# Patient Record
Sex: FEMALE | Race: WHITE | NOT HISPANIC OR LATINO
[De-identification: names, ages, dates, MRNs, and addresses within clinical notes are randomized per-mention and may not be internally consistent; named-entity substitution may affect disease eponyms.]

---

## 2023-09-18 ENCOUNTER — NON-APPOINTMENT (OUTPATIENT)
Age: 53
End: 2023-09-18

## 2023-09-19 ENCOUNTER — APPOINTMENT (OUTPATIENT)
Dept: ORTHOPEDIC SURGERY | Facility: CLINIC | Age: 53
End: 2023-09-19
Payer: COMMERCIAL

## 2023-09-19 VITALS — HEIGHT: 67 IN | WEIGHT: 218 LBS | BODY MASS INDEX: 34.21 KG/M2

## 2023-09-19 DIAGNOSIS — Z86.39 PERSONAL HISTORY OF OTHER ENDOCRINE, NUTRITIONAL AND METABOLIC DISEASE: ICD-10-CM

## 2023-09-19 DIAGNOSIS — Z78.9 OTHER SPECIFIED HEALTH STATUS: ICD-10-CM

## 2023-09-19 DIAGNOSIS — M81.0 AGE-RELATED OSTEOPOROSIS W/OUT CURRENT PATHOLOGICAL FRACTURE: ICD-10-CM

## 2023-09-19 DIAGNOSIS — J45.909 UNSPECIFIED ASTHMA, UNCOMPLICATED: ICD-10-CM

## 2023-09-19 DIAGNOSIS — N81.10 CYSTOCELE, UNSPECIFIED: ICD-10-CM

## 2023-09-19 DIAGNOSIS — Z87.39 PERSONAL HISTORY OF OTHER DISEASES OF THE MUSCULOSKELETAL SYSTEM AND CONNECTIVE TISSUE: ICD-10-CM

## 2023-09-19 PROBLEM — Z00.00 ENCOUNTER FOR PREVENTIVE HEALTH EXAMINATION: Status: ACTIVE | Noted: 2023-09-19

## 2023-09-19 PROCEDURE — 99213 OFFICE O/P EST LOW 20 MIN: CPT

## 2023-09-19 PROCEDURE — 73562 X-RAY EXAM OF KNEE 3: CPT | Mod: LT

## 2023-09-19 RX ORDER — ASPIRIN 325 MG/1
TABLET, FILM COATED ORAL
Refills: 0 | Status: ACTIVE | COMMUNITY

## 2023-09-19 RX ORDER — LEVOTHYROXINE SODIUM 137 UG/1
TABLET ORAL
Refills: 0 | Status: ACTIVE | COMMUNITY

## 2023-09-19 RX ORDER — CETIRIZINE HYDROCHLORIDE 10 MG/1
TABLET, FILM COATED ORAL
Refills: 0 | Status: ACTIVE | COMMUNITY

## 2023-09-19 RX ORDER — SUMATRIPTAN SUCCINATE 100 MG/1
TABLET, FILM COATED ORAL
Refills: 0 | Status: ACTIVE | COMMUNITY

## 2023-09-27 ENCOUNTER — APPOINTMENT (OUTPATIENT)
Dept: ORTHOPEDIC SURGERY | Facility: CLINIC | Age: 53
End: 2023-09-27
Payer: COMMERCIAL

## 2023-09-27 VITALS — BODY MASS INDEX: 34.53 KG/M2 | WEIGHT: 220 LBS | HEIGHT: 67 IN

## 2023-09-27 PROCEDURE — 99213 OFFICE O/P EST LOW 20 MIN: CPT

## 2023-09-27 PROCEDURE — 73562 X-RAY EXAM OF KNEE 3: CPT | Mod: LT

## 2023-10-11 ENCOUNTER — APPOINTMENT (OUTPATIENT)
Dept: ORTHOPEDIC SURGERY | Facility: CLINIC | Age: 53
End: 2023-10-11
Payer: COMMERCIAL

## 2023-10-11 VITALS — WEIGHT: 220 LBS | HEIGHT: 67 IN | BODY MASS INDEX: 34.53 KG/M2

## 2023-10-11 PROCEDURE — 99213 OFFICE O/P EST LOW 20 MIN: CPT

## 2023-10-11 PROCEDURE — 73562 X-RAY EXAM OF KNEE 3: CPT | Mod: LT

## 2023-10-18 ENCOUNTER — NON-APPOINTMENT (OUTPATIENT)
Age: 53
End: 2023-10-18

## 2023-10-25 ENCOUNTER — APPOINTMENT (OUTPATIENT)
Dept: ORTHOPEDIC SURGERY | Facility: CLINIC | Age: 53
End: 2023-10-25
Payer: COMMERCIAL

## 2023-10-25 VITALS — WEIGHT: 220 LBS | HEIGHT: 67 IN | BODY MASS INDEX: 34.53 KG/M2

## 2023-10-25 PROCEDURE — 73562 X-RAY EXAM OF KNEE 3: CPT | Mod: LT

## 2023-10-25 PROCEDURE — 99213 OFFICE O/P EST LOW 20 MIN: CPT

## 2023-11-08 ENCOUNTER — APPOINTMENT (OUTPATIENT)
Dept: ORTHOPEDIC SURGERY | Facility: CLINIC | Age: 53
End: 2023-11-08
Payer: COMMERCIAL

## 2023-11-08 VITALS — WEIGHT: 213 LBS | BODY MASS INDEX: 33.43 KG/M2 | HEIGHT: 67 IN

## 2023-11-08 PROCEDURE — 99213 OFFICE O/P EST LOW 20 MIN: CPT

## 2023-11-08 PROCEDURE — 73560 X-RAY EXAM OF KNEE 1 OR 2: CPT | Mod: LT

## 2023-12-13 ENCOUNTER — APPOINTMENT (OUTPATIENT)
Dept: ORTHOPEDIC SURGERY | Facility: CLINIC | Age: 53
End: 2023-12-13
Payer: COMMERCIAL

## 2023-12-13 VITALS — BODY MASS INDEX: 33.43 KG/M2 | WEIGHT: 213 LBS | HEIGHT: 67 IN

## 2023-12-13 PROCEDURE — 73562 X-RAY EXAM OF KNEE 3: CPT | Mod: LT

## 2023-12-13 PROCEDURE — 99213 OFFICE O/P EST LOW 20 MIN: CPT

## 2023-12-13 NOTE — ASSESSMENT
[FreeTextEntry1] : Patient is doing much better but needs to work on strengthening her quads to improve her stairclimbing ability and distance walking she will continue work with the therapist and return for follow-up examination in 4 weeks.

## 2023-12-13 NOTE — HISTORY OF PRESENT ILLNESS
[Dull/Aching] : dull/aching [Throbbing] : throbbing [Meds] : meds [Ice] : ice [Heat] : heat [Standing] : standing [3] : 3 [1] : 2 [Intermittent] : intermittent [Physical therapy] : physical therapy [Walking] : walking [Retired] : Work status: retired [] : Post Surgical Visit: no [FreeTextEntry1] : left knee [FreeTextEntry5] : Going to PT. Swelling is uncomfortable [FreeTextEntry6] : pulling [de-identified] : mri and xray

## 2023-12-13 NOTE — DATA REVIEWED
[FreeTextEntry1] : 3 x-rays of the left knee are done and reviewed today showing satisfied position and healing of the tibial plateau fracture site

## 2023-12-13 NOTE — REASON FOR VISIT
[FreeTextEntry2] : FxFu- left knee Patient returns to the office in follow-up of her left knee tibial plateau fracture at this point time she has minimal pain.  She has missed the last week or so of therapy to her recent illness but she is feeling better at this point time examination reveals ambulates with a normal gait left knee has minimal swelling with range of motion 0 to 120 degrees he has no significant tenderness at the previous tibial plateau fracture site her quad strength is somewhat decreased at 4 out of 5 and she has no gross instability in the left knee.

## 2024-01-17 ENCOUNTER — APPOINTMENT (OUTPATIENT)
Dept: ORTHOPEDIC SURGERY | Facility: CLINIC | Age: 54
End: 2024-01-17
Payer: COMMERCIAL

## 2024-01-17 VITALS — HEIGHT: 67 IN | BODY MASS INDEX: 33.43 KG/M2 | WEIGHT: 213 LBS

## 2024-01-17 PROCEDURE — 99213 OFFICE O/P EST LOW 20 MIN: CPT

## 2024-01-17 NOTE — ASSESSMENT
[FreeTextEntry1] : Patient continues to gradually improve regarding her knee injury.  Presently she is ambulating pretty well however her she does still have some mild discomfort with stairs and getting out of a chair situation I stressed the importance of working on her quadricep strength and hopefully this will continue to improve her overall function she will do that and she will return in approximately 4 weeks.

## 2024-01-17 NOTE — REASON FOR VISIT
[FreeTextEntry2] : Pain left knee Patient returns to the office in follow-up of her left knee she presently reports minimal pain in the knee but does still have some difficulty especially going up and down stairs and getting out of a chair [place] of the left knee today reveals ambulates with a fairly normal gait left knee range of motion is from 0-120 she has minimal crepitus on range of motion she has no gross instability Quad strength is 4 out of 5 on the left side

## 2024-01-17 NOTE — HISTORY OF PRESENT ILLNESS
[3] : 3 [Dull/Aching] : dull/aching [Rest] : rest [Ice] : ice [Heat] : heat [Standing] : standing [Walking] : walking [Stairs] : stairs [] : no [FreeTextEntry1] : left knee [de-identified] : getting up [de-identified] : 12/13/23 [de-identified] : dr Huddleston [de-identified] : 1/24 [de-identified] : PT

## 2024-02-21 ENCOUNTER — APPOINTMENT (OUTPATIENT)
Dept: ORTHOPEDIC SURGERY | Facility: CLINIC | Age: 54
End: 2024-02-21
Payer: COMMERCIAL

## 2024-02-21 PROCEDURE — 99213 OFFICE O/P EST LOW 20 MIN: CPT

## 2024-02-21 NOTE — HISTORY OF PRESENT ILLNESS
[Dull/Aching] : dull/aching [Rest] : rest [Ice] : ice [Heat] : heat [Standing] : standing [Walking] : walking [Stairs] : stairs [2] : 2 [0] : 0 [] : no [FreeTextEntry1] : left knee [FreeTextEntry6] : pulling [de-identified] : getting up [de-identified] : 12/13/23 [de-identified] : dr Huddleston [de-identified] : 1/24 [de-identified] : PT

## 2024-02-21 NOTE — ASSESSMENT
[FreeTextEntry1] : Patient continues to gradually improve improve her overall function she continues to work on her strength she continues to work with the therapist to increase her quad strength and normalize her gait and make stairs easier she will do that and return 1 more time in 3 to 4 weeks for final evaluation.

## 2024-02-21 NOTE — REASON FOR VISIT
[FreeTextEntry2] : FU - left knee Patient returns to the office with no significant plaints of pain radiating into her left knee she is walking well with no significant pain she still reports some discomfort going up and down stairs especially going down and she is attending therapy to try and keep building up her quad strength range of motion knee 0-1 20 minimal swelling noted and quad strength 4 out of 5 no gross instability in the left knee

## 2024-03-13 ENCOUNTER — APPOINTMENT (OUTPATIENT)
Dept: ORTHOPEDIC SURGERY | Facility: CLINIC | Age: 54
End: 2024-03-13
Payer: COMMERCIAL

## 2024-03-13 VITALS — HEIGHT: 67 IN | WEIGHT: 213 LBS | BODY MASS INDEX: 33.43 KG/M2

## 2024-03-13 PROCEDURE — 99213 OFFICE O/P EST LOW 20 MIN: CPT

## 2024-03-13 RX ORDER — VITAMIN K2 90 MCG
1000 CAPSULE ORAL
Refills: 0 | Status: ACTIVE | COMMUNITY

## 2024-03-13 NOTE — REASON FOR VISIT
[FreeTextEntry2] : Continued pain and swelling left knee Patient returns to the office describing minimal symptoms regarding her left knee and she continues to gradually increase her strength with physical therapy he is ambulating with a fairly normal gait left knee has minimal swelling range of motion 0-1 25 she has no gross instability no significant tenderness at the fracture site she does appear to have some mild patella subluxation laterally in addition to some patellofemoral symptoms quad strength is 4 out of 5 slightly decreased compared to the contralateral side

## 2024-03-13 NOTE — HISTORY OF PRESENT ILLNESS
[0] : 0 [Tightness] : tightness [Squeezing] : squeezing [Retired] : Work status: retired [] : no [FreeTextEntry1] : left knee [de-identified] : DR burton [de-identified] : pt

## 2024-03-13 NOTE — ASSESSMENT
[FreeTextEntry1] : Patient will continue work with the physical therapist on increasing her quad strength especially on the left side since I believe some of her current symptoms are patellofemoral in etiology she may consider getting on kinesiotaping over the left knee and we will recheck her in 3 to 4 weeks and maybe do a new x-ray to evaluate the patellofemoral alignment at this point time she may is mainly needs to continue work on her quad strength.

## 2024-04-10 ENCOUNTER — APPOINTMENT (OUTPATIENT)
Dept: ORTHOPEDIC SURGERY | Facility: CLINIC | Age: 54
End: 2024-04-10
Payer: COMMERCIAL

## 2024-04-10 VITALS — WEIGHT: 220 LBS | HEIGHT: 67 IN | BODY MASS INDEX: 34.53 KG/M2

## 2024-04-10 PROCEDURE — 73562 X-RAY EXAM OF KNEE 3: CPT | Mod: LT

## 2024-04-10 PROCEDURE — 99213 OFFICE O/P EST LOW 20 MIN: CPT

## 2024-04-10 NOTE — REASON FOR VISIT
[FreeTextEntry2] :  Patient returns to the office in follow-up regarding her left knee with decreased pain since the previous visit examination today reveals ambulates with a fairly normal gait left knee has minimal swelling with range of motion 0-1 20 she has no gross instability she has no significant tenderness at the previous fracture site no negative meniscal signs and quad strength is 4 out of 5 on the left side

## 2024-04-10 NOTE — DATA REVIEWED
[FreeTextEntry1] : 3 x-rays of the left knee are performed today and are consistent with full healing of the fracture site with no significant displacement

## 2024-04-10 NOTE — HISTORY OF PRESENT ILLNESS
[0] : 0 [Tightness] : tightness [Squeezing] : squeezing [Retired] : Work status: retired [] : no [FreeTextEntry1] : left knee [de-identified] : DR burton [FreeTextEntry5] : still going to PT [de-identified] : pt

## 2024-04-10 NOTE — ASSESSMENT
[FreeTextEntry1] : Patient will continue work with the physical therapist on increasing her quad strength  Hopefully this will help with getting out of chairs and going up and down stairs I believe that is her weakness at this point time she will be rechecked again in 4 weeks

## 2024-05-14 ENCOUNTER — APPOINTMENT (OUTPATIENT)
Dept: ORTHOPEDIC SURGERY | Facility: CLINIC | Age: 54
End: 2024-05-14
Payer: COMMERCIAL

## 2024-05-14 VITALS — WEIGHT: 220 LBS | HEIGHT: 67 IN | BODY MASS INDEX: 34.53 KG/M2

## 2024-05-14 PROCEDURE — 99213 OFFICE O/P EST LOW 20 MIN: CPT

## 2024-05-14 NOTE — HISTORY OF PRESENT ILLNESS
[2] : 2 [0] : 0 [Dull/Aching] : dull/aching [Retired] : Work status: retired [FreeTextEntry1] : Left knee [] : no [FreeTextEntry5] : Patient reports that she has been attending physical therapy 2x a week and notes improvement, stating that her strength has been improving.

## 2024-05-14 NOTE — ASSESSMENT
[FreeTextEntry1] : Patient is scheduled to have some unrelated surgery in the next week to 10 days and will have to limit her activities she will recheck here in 3 to 4 weeks to see if she has any further therapy.

## 2024-05-14 NOTE — REASON FOR VISIT
[FreeTextEntry2] : Follow up: Left knee Patient returns the office in follow-up regarding the left tibial plateau fracture at this point in time her pain is minimal and she is doing well she saw some mild difficulty going downstairs mostly range of motion 0-1 25 with minimal minimal pain and no significant swelling quad strength is 4+ out of 5 on the left side

## 2024-06-12 ENCOUNTER — APPOINTMENT (OUTPATIENT)
Dept: ORTHOPEDIC SURGERY | Facility: CLINIC | Age: 54
End: 2024-06-12
Payer: COMMERCIAL

## 2024-06-12 ENCOUNTER — APPOINTMENT (OUTPATIENT)
Dept: ORTHOPEDIC SURGERY | Facility: CLINIC | Age: 54
End: 2024-06-12

## 2024-06-12 VITALS — BODY MASS INDEX: 34.53 KG/M2 | WEIGHT: 220 LBS | HEIGHT: 67 IN

## 2024-06-12 DIAGNOSIS — Z86.39 PERSONAL HISTORY OF OTHER ENDOCRINE, NUTRITIONAL AND METABOLIC DISEASE: ICD-10-CM

## 2024-06-12 DIAGNOSIS — S82.142A DISPLACED BICONDYLAR FRACTURE OF LEFT TIBIA, INITIAL ENCOUNTER FOR CLOSED FRACTURE: ICD-10-CM

## 2024-06-12 PROCEDURE — 99204 OFFICE O/P NEW MOD 45 MIN: CPT

## 2024-06-12 NOTE — HISTORY OF PRESENT ILLNESS
[de-identified] : 06/12/2024 Ms. MARJAN DUNLAP, a 54 year old female (RHD, retired, water aerobics, walking) , presents today for L knee pain. Has been seeing Dr Huddleston for nondisplaced left tibial plateau fracture sustained on 9/18/23, did 2 months with a brace and NWB. Last xray done on 4/10/24. States attending PT twice a week. PMHx: parathyroid surgery 5/1/24

## 2024-06-12 NOTE — DISCUSSION/SUMMARY
[de-identified] : 54f s/p left knee lateral tibial plateau fx. Improved with PT. Discussed potential for increased arthritis in the future with this injury.  1) complete PT and c/w hep  2) cryotherapy, rest and activity modification  3) rtc prn, can rtc at 1 year s/p injury in september.   Entered by Rosamaria Thomas acting as scribe. Dr. Vora- The documentation recorded by the scribe accurately reflects the service I personally performed and the decisions made by me.

## 2024-09-18 ENCOUNTER — APPOINTMENT (OUTPATIENT)
Dept: ORTHOPEDIC SURGERY | Facility: CLINIC | Age: 54
End: 2024-09-18
Payer: COMMERCIAL

## 2024-09-18 VITALS — WEIGHT: 220 LBS | HEIGHT: 67 IN | BODY MASS INDEX: 34.53 KG/M2

## 2024-09-18 VITALS — BODY MASS INDEX: 34.53 KG/M2 | WEIGHT: 220 LBS | HEIGHT: 67 IN

## 2024-09-18 DIAGNOSIS — S82.142A DISPLACED BICONDYLAR FRACTURE OF LEFT TIBIA, INITIAL ENCOUNTER FOR CLOSED FRACTURE: ICD-10-CM

## 2024-09-18 PROCEDURE — 99213 OFFICE O/P EST LOW 20 MIN: CPT

## 2024-09-18 NOTE — DISCUSSION/SUMMARY
[de-identified] : 54f s/p left knee lateral tibial plateau fx. Improved with PT. Discussed potential for increased arthritis in the future with this injury; anticipate future intervention needed either with injection or surgical intervention.  1) activity as tolerated 2) rtc prn   Entered by Rosamaria Thomas acting as scribe. Dr. Vora- The documentation recorded by the scribe accurately reflects the service I personally performed and the decisions made by me.

## 2024-09-18 NOTE — HISTORY OF PRESENT ILLNESS
[de-identified] : 9/18/24:Here to f/up left knee now 1 year s/p left tibial plateau fracture. Reports continuing with HEP on own and has returned to her recreational activities. Reports mild aching here and there, but otherwise no complaints.   06/12/2024 Ms. MARJAN DUNLAP, a 54 year old female (RHD, retired, water aerobics, walking) , presents today for L knee pain. Has been seeing Dr Huddleston for nondisplaced left tibial plateau fracture sustained on 9/18/23, did 2 months with a brace and NWB. Last xray done on 4/10/24. States attending PT twice a week. PMHx: parathyroid surgery 5/1/24

## 2024-09-18 NOTE — PHYSICAL EXAM
[Left] : left knee [NL (0)] : extension 0 degrees [Negative] : negative Karl's [] : no pain with varus stress [[id number]] : flexion 135 degrees